# Patient Record
Sex: FEMALE | Race: WHITE
[De-identification: names, ages, dates, MRNs, and addresses within clinical notes are randomized per-mention and may not be internally consistent; named-entity substitution may affect disease eponyms.]

---

## 2023-06-18 ENCOUNTER — HOSPITAL ENCOUNTER (EMERGENCY)
Dept: HOSPITAL 95 - ER | Age: 68
Discharge: HOME | End: 2023-06-18
Payer: MEDICARE

## 2023-06-18 VITALS — SYSTOLIC BLOOD PRESSURE: 142 MMHG | DIASTOLIC BLOOD PRESSURE: 62 MMHG

## 2023-06-18 VITALS — WEIGHT: 134.99 LBS | BODY MASS INDEX: 23.92 KG/M2 | HEIGHT: 63 IN

## 2023-06-18 DIAGNOSIS — M25.561: Primary | ICD-10-CM

## 2023-06-18 DIAGNOSIS — Z88.0: ICD-10-CM
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
## 2023-06-18 PROCEDURE — A9270 NON-COVERED ITEM OR SERVICE: HCPCS

## 2023-08-24 ENCOUNTER — HOSPITAL ENCOUNTER (OUTPATIENT)
Dept: HOSPITAL 95 - ORSCSDS | Age: 68
Discharge: HOME | End: 2023-08-24
Payer: MEDICARE

## 2023-08-24 VITALS — BODY MASS INDEX: 24.34 KG/M2 | HEIGHT: 63 IN | WEIGHT: 137.35 LBS

## 2023-08-24 VITALS — SYSTOLIC BLOOD PRESSURE: 134 MMHG | DIASTOLIC BLOOD PRESSURE: 77 MMHG

## 2023-08-24 DIAGNOSIS — M94.261: ICD-10-CM

## 2023-08-24 DIAGNOSIS — S83.241A: Primary | ICD-10-CM

## 2023-08-24 DIAGNOSIS — S83.281A: ICD-10-CM

## 2023-08-24 DIAGNOSIS — M67.51: ICD-10-CM

## 2023-08-24 PROCEDURE — 0SJC4ZZ INSPECTION OF RIGHT KNEE JOINT, PERCUTANEOUS ENDOSCOPIC APPROACH: ICD-10-PCS

## 2023-08-24 PROCEDURE — A9270 NON-COVERED ITEM OR SERVICE: HCPCS

## 2023-08-24 NOTE — NUR
08/24/23 1033 Rosalind Ortiz
1MG OF EPI (1MG/ML) VERIFIED AND ADDED TO THE FIRST BAG OF FLUID USED
FOR IRRIGATION AT THE [facility] BY DR GARNER.

## 2023-08-24 NOTE — NUR
08/24/23 1105 LUIS E CARRANZA
PT SCRATCHING ARMS AND LEGS. LOTION APPLIED TO AREAS OPEN TO AIR.
CURRENTLY PT NOW STATES PAIN 6/10.